# Patient Record
Sex: FEMALE | Race: WHITE | NOT HISPANIC OR LATINO | Employment: FULL TIME | URBAN - METROPOLITAN AREA
[De-identification: names, ages, dates, MRNs, and addresses within clinical notes are randomized per-mention and may not be internally consistent; named-entity substitution may affect disease eponyms.]

---

## 2023-09-18 PROBLEM — N83.201 RIGHT OVARIAN CYST: Status: ACTIVE | Noted: 2023-09-18

## 2023-09-18 PROBLEM — N20.0 KIDNEY STONES: Status: ACTIVE | Noted: 2023-09-18

## 2023-09-18 RX ORDER — MINOCYCLINE HYDROCHLORIDE 50 MG/1
1 CAPSULE ORAL EVERY 6 HOURS
COMMUNITY

## 2023-10-03 ENCOUNTER — OFFICE VISIT (OUTPATIENT)
Dept: PRIMARY CARE | Facility: CLINIC | Age: 38
End: 2023-10-03
Payer: COMMERCIAL

## 2023-10-03 VITALS
DIASTOLIC BLOOD PRESSURE: 70 MMHG | WEIGHT: 82 LBS | SYSTOLIC BLOOD PRESSURE: 120 MMHG | OXYGEN SATURATION: 97 % | HEART RATE: 97 BPM

## 2023-10-03 DIAGNOSIS — E55.9 VITAMIN D DEFICIENCY: ICD-10-CM

## 2023-10-03 DIAGNOSIS — Z72.0 TOBACCO USE: ICD-10-CM

## 2023-10-03 DIAGNOSIS — R06.00 DYSPNEA, UNSPECIFIED TYPE: ICD-10-CM

## 2023-10-03 DIAGNOSIS — M25.562 ACUTE PAIN OF LEFT KNEE: ICD-10-CM

## 2023-10-03 DIAGNOSIS — R73.9 HYPERGLYCEMIA: ICD-10-CM

## 2023-10-03 DIAGNOSIS — R63.4 WEIGHT LOSS, UNINTENTIONAL: ICD-10-CM

## 2023-10-03 DIAGNOSIS — E03.9 HYPOTHYROIDISM, UNSPECIFIED TYPE: ICD-10-CM

## 2023-10-03 DIAGNOSIS — E78.2 MIXED HYPERLIPIDEMIA: ICD-10-CM

## 2023-10-03 DIAGNOSIS — Z00.00 ANNUAL PHYSICAL EXAM: Primary | ICD-10-CM

## 2023-10-03 DIAGNOSIS — Z01.89 ENCOUNTER FOR ROUTINE LABORATORY TESTING: ICD-10-CM

## 2023-10-03 DIAGNOSIS — R06.02 SHORTNESS OF BREATH ON EXERTION: ICD-10-CM

## 2023-10-03 PROCEDURE — 99385 PREV VISIT NEW AGE 18-39: CPT | Performed by: NURSE PRACTITIONER

## 2023-10-03 PROCEDURE — 4004F PT TOBACCO SCREEN RCVD TLK: CPT | Performed by: NURSE PRACTITIONER

## 2023-10-03 ASSESSMENT — ENCOUNTER SYMPTOMS
NEUROLOGICAL NEGATIVE: 1
GASTROINTESTINAL NEGATIVE: 1
MUSCULOSKELETAL NEGATIVE: 1
CONSTITUTIONAL NEGATIVE: 1
CARDIOVASCULAR NEGATIVE: 1
PSYCHIATRIC NEGATIVE: 1
SHORTNESS OF BREATH: 1
HEMATOLOGIC/LYMPHATIC NEGATIVE: 1
ALLERGIC/IMMUNOLOGIC NEGATIVE: 1
ENDOCRINE NEGATIVE: 1
COUGH: 1
EYES NEGATIVE: 1

## 2023-10-03 ASSESSMENT — PAIN SCALES - GENERAL: PAINLEVEL: 6

## 2023-10-03 NOTE — PROGRESS NOTES
"[facility]: [facility]  PROGRESS NOTE      Annalee Banuelos is a 38 y.o. female that is presenting today to establish PCP. She is currently c/o left knee pain and unintentional weight loss. She is also requesting CPE today as it has been \"Years\" since last medical check up.    ASSESSMENT / PLAN:  Diagnoses and all orders for this visit:  Annual physical exam  Tobacco use  Weight loss, unintentional  Acute pain of left knee  -     XR knee left 3 views; Future  Shortness of breath on exertion  Encounter for routine laboratory testing  -     Basic Metabolic Panel; Future  -     CBC and Auto Differential; Future  -     Hemoglobin A1C; Future  -     Hepatic Function Panel; Future  -     Lipid Panel; Future  -     Vitamin D 25-Hydroxy,Total (for eval of Vitamin D levels); Future  Hyperglycemia  -     Hemoglobin A1C; Future  Mixed hyperlipidemia  -     Lipid Panel; Future  Vitamin D deficiency  -     Vitamin D 25-Hydroxy,Total (for eval of Vitamin D levels); Future  Hypothyroidism, unspecified type  -     Thyroid Stimulating Hormone; Future  Dyspnea, unspecified type  -     XR chest 2 views; Future  Subjective   Subjective   Annalee Banuelos is a 38 y.o. female who presents with knee pain involving the left knee. Onset was sudden, not related to any specific activity. Inciting event: none known. Current symptoms include: pain located anterior and lateral. Pain is aggravated by rising after sitting and squatting. Patient has had no prior knee problems. Evaluation to date: none. Treatment to date: OTC analgesics which are somewhat effective.    Objective   /70 (BP Location: Left arm, Patient Position: Sitting, BP Cuff Size: Small adult)   Pulse 97   Wt (!) 37.2 kg (82 lb)   SpO2 97%   Right knee: normal  Left knee:  no effusion, full active range of motion, no joint line tenderness, ligamentous structures intact.    X-ray: ordered, but results not yet available.    Subjective  Annalee Banuelos " "is a 38 y.o. female here for discussion regarding unexplained/excessive weight loss. Onset was 1 year ago. Patient has lost approximately 20 pounds in last 1 year. She feels ideal weight is 95 pounds. History of eating disorders: none. Patient is not exercising at all. Factors associated with weight loss: none. Patient denies: abdominal pain, binge/ purge behaviors, constipation, depressive symptoms, diarrhea, heat intolerance, intentional calorie restriction, poor appetite, and unexplained fatigue.  Patient reports she quit ETOH approximately 1 year ago. She reports \"a couple beers a night\".  She reports her usual weight is 100-105#    Objective  There is no height or weight on file to calculate BMI.  [unfilled]    Assessment/Plan  Unexplained weight loss.  Initiate evaluation for hyperthyroidism.       Assessment/Plan   Left Mild knee sprain on the left     Rest, ice, compression, and elevation (RICE) therapy.  Reduction in offending activity.  Patellar compression sleeve.  PT referral.            Review of Systems   Constitutional: Negative.    HENT: Negative.     Eyes: Negative.    Respiratory:  Positive for cough and shortness of breath.         SOBE. + Tobacco use - 1ppd x 23yrs   Cardiovascular: Negative.    Gastrointestinal: Negative.    Endocrine: Negative.    Genitourinary: Negative.         + Hx of renal calculi   Musculoskeletal: Negative.    Skin: Negative.    Allergic/Immunologic: Negative.    Neurological: Negative.    Hematological: Negative.    Psychiatric/Behavioral: Negative.     All other systems reviewed and are negative.     Objective   Vitals:    10/03/23 1425   BP: 120/70   Pulse: 97   SpO2: 97%      There is no height or weight on file to calculate BMI.  Physical Exam  Vitals and nursing note reviewed.   Constitutional:       General: She is not in acute distress.     Appearance: She is underweight. She is not ill-appearing.   HENT:      Head: Normocephalic and atraumatic.      Right Ear: " "Tympanic membrane, ear canal and external ear normal. There is no impacted cerumen.      Left Ear: Tympanic membrane, ear canal and external ear normal. There is no impacted cerumen.      Nose: Nose normal.      Mouth/Throat:      Mouth: Mucous membranes are moist.      Pharynx: Oropharynx is clear. No oropharyngeal exudate or posterior oropharyngeal erythema.   Eyes:      General: No scleral icterus.        Right eye: No discharge.         Left eye: No discharge.      Extraocular Movements: Extraocular movements intact.      Conjunctiva/sclera: Conjunctivae normal.      Pupils: Pupils are equal, round, and reactive to light.   Neck:      Vascular: No carotid bruit.   Cardiovascular:      Rate and Rhythm: Normal rate and regular rhythm.      Pulses: Normal pulses.      Heart sounds: Normal heart sounds. No murmur heard.     No friction rub. No gallop.   Pulmonary:      Effort: Pulmonary effort is normal. No respiratory distress.      Breath sounds: Normal breath sounds.   Abdominal:      General: Abdomen is flat. Bowel sounds are normal. There is no distension.      Palpations: Abdomen is soft.      Tenderness: There is no abdominal tenderness.      Hernia: No hernia is present.   Musculoskeletal:         General: No swelling or tenderness. Normal range of motion.   Lymphadenopathy:      Cervical: No cervical adenopathy.   Skin:     General: Skin is warm and dry.      Capillary Refill: Capillary refill takes less than 2 seconds.      Coloration: Skin is not jaundiced.      Findings: No rash.   Neurological:      General: No focal deficit present.      Mental Status: She is alert and oriented to person, place, and time. Mental status is at baseline.   Psychiatric:         Mood and Affect: Mood normal.         Behavior: Behavior normal.       Diagnostic Results   No results found for: \"GLUCOSE\", \"CALCIUM\", \"NA\", \"K\", \"CO2\", \"CL\", \"BUN\", \"CREATININE\"  No results found for: \"ALT\", \"AST\", \"GGT\", \"ALKPHOS\", \"BILITOT\"  No " "results found for: \"WBC\", \"HGB\", \"HCT\", \"MCV\", \"PLT\"  No results found for: \"CHOL\"  No results found for: \"HDL\"  No results found for: \"LDLCALC\"  No results found for: \"TRIG\"  No components found for: \"CHOLHDL\"  No results found for: \"HGBA1C\"  Other labs not included in the list above were reviewed either before or during this encounter.    History    History reviewed. No pertinent past medical history.  Past Surgical History:   Procedure Laterality Date    BACK SURGERY      scoliosis surgery     Family History   Problem Relation Name Age of Onset    Hypertension Mother      Hypertension Father      Scoliosis Father      No Known Problems Brother      No Known Problems Brother      No Known Problems Son          heart problems    No Known Problems Son      Hypertension Maternal Grandmother      Breast cancer Maternal Grandmother      Hypertension Maternal Grandfather      Diabetes Maternal Grandfather      Hypertension Paternal Grandmother      Diabetes Paternal Grandmother      Diabetes Paternal Grandfather      Hypertension Paternal Grandfather      Liver cancer Paternal Grandfather      Kidney cancer Paternal Grandfather       No Known Allergies  Current Outpatient Medications on File Prior to Visit   Medication Sig Dispense Refill    levonorgestrel (Mirena) 21 mcg/24 hours (8 yrs) 52 mg IUD by intrauterine route.      minocycline 50 mg capsule Take 1 capsule (50 mg) by mouth every 6 hours.       No current facility-administered medications on file prior to visit.     Immunization History   Administered Date(s) Administered    Hepatitis B vaccine, pediatric/adolescent (RECOMBIVAX, ENGERIX) 01/12/2000, 02/14/2000, 08/17/2000    PPD Test 03/04/2011, 04/04/2011, 04/11/2011    Pfizer Purple Cap SARS-CoV-2 08/28/2021, 09/18/2021    Tdap vaccine, age 7 year and older (BOOSTRIX) 03/04/2011     Patient's medical history was reviewed and updated either before or during this encounter.    Rcuhi Dang, APRN-CNP  "

## 2023-10-04 ENCOUNTER — ANCILLARY PROCEDURE (OUTPATIENT)
Dept: RADIOLOGY | Facility: CLINIC | Age: 38
End: 2023-10-04
Payer: COMMERCIAL

## 2023-10-04 ENCOUNTER — LAB (OUTPATIENT)
Dept: LAB | Facility: LAB | Age: 38
End: 2023-10-04
Payer: COMMERCIAL

## 2023-10-04 DIAGNOSIS — E55.9 VITAMIN D DEFICIENCY: ICD-10-CM

## 2023-10-04 DIAGNOSIS — R73.9 HYPERGLYCEMIA: ICD-10-CM

## 2023-10-04 DIAGNOSIS — M25.562 ACUTE PAIN OF LEFT KNEE: ICD-10-CM

## 2023-10-04 DIAGNOSIS — Z01.89 ENCOUNTER FOR ROUTINE LABORATORY TESTING: ICD-10-CM

## 2023-10-04 DIAGNOSIS — E78.2 MIXED HYPERLIPIDEMIA: ICD-10-CM

## 2023-10-04 DIAGNOSIS — E03.9 HYPOTHYROIDISM, UNSPECIFIED TYPE: ICD-10-CM

## 2023-10-04 DIAGNOSIS — R06.00 DYSPNEA, UNSPECIFIED TYPE: ICD-10-CM

## 2023-10-04 LAB
25(OH)D3 SERPL-MCNC: 11 NG/ML (ref 31–100)
ALBUMIN SERPL-MCNC: 4.8 G/DL (ref 3.5–5)
ALP BLD-CCNC: 70 U/L (ref 35–125)
ALT SERPL-CCNC: 17 U/L (ref 5–40)
ANION GAP SERPL CALC-SCNC: 12 MMOL/L
AST SERPL-CCNC: 20 U/L (ref 5–40)
BASOPHILS # BLD AUTO: 0.06 X10*3/UL (ref 0–0.1)
BASOPHILS NFR BLD AUTO: 0.8 %
BILIRUB DIRECT SERPL-MCNC: <0.2 MG/DL (ref 0–0.2)
BILIRUB SERPL-MCNC: 0.4 MG/DL (ref 0.1–1.2)
BUN SERPL-MCNC: 11 MG/DL (ref 8–25)
CALCIUM SERPL-MCNC: 9.3 MG/DL (ref 8.5–10.4)
CHLORIDE SERPL-SCNC: 103 MMOL/L (ref 97–107)
CHOLEST SERPL-MCNC: 194 MG/DL (ref 133–200)
CHOLEST/HDLC SERPL: 2.8 {RATIO}
CO2 SERPL-SCNC: 25 MMOL/L (ref 24–31)
CREAT SERPL-MCNC: 0.5 MG/DL (ref 0.4–1.6)
EOSINOPHIL # BLD AUTO: 0.07 X10*3/UL (ref 0–0.7)
EOSINOPHIL NFR BLD AUTO: 0.9 %
ERYTHROCYTE [DISTWIDTH] IN BLOOD BY AUTOMATED COUNT: 13.1 % (ref 11.5–14.5)
EST. AVERAGE GLUCOSE BLD GHB EST-MCNC: 100 MG/DL
GFR SERPL CREATININE-BSD FRML MDRD: >90 ML/MIN/1.73M*2
GLUCOSE SERPL-MCNC: 104 MG/DL (ref 65–99)
HBA1C MFR BLD: 5.1 %
HCT VFR BLD AUTO: 50.4 % (ref 36–46)
HDLC SERPL-MCNC: 70 MG/DL
HGB BLD-MCNC: 16.2 G/DL (ref 12–16)
IMM GRANULOCYTES # BLD AUTO: 0.01 X10*3/UL (ref 0–0.7)
IMM GRANULOCYTES NFR BLD AUTO: 0.1 % (ref 0–0.9)
LDLC SERPL CALC-MCNC: 107 MG/DL (ref 65–130)
LYMPHOCYTES # BLD AUTO: 2.46 X10*3/UL (ref 1.2–4.8)
LYMPHOCYTES NFR BLD AUTO: 33.2 %
MCH RBC QN AUTO: 32 PG (ref 26–34)
MCHC RBC AUTO-ENTMCNC: 32.1 G/DL (ref 32–36)
MCV RBC AUTO: 99 FL (ref 80–100)
MONOCYTES # BLD AUTO: 0.54 X10*3/UL (ref 0.1–1)
MONOCYTES NFR BLD AUTO: 7.3 %
NEUTROPHILS # BLD AUTO: 4.26 X10*3/UL (ref 1.2–7.7)
NEUTROPHILS NFR BLD AUTO: 57.7 %
NRBC BLD-RTO: 0 /100 WBCS (ref 0–0)
PLATELET # BLD AUTO: 176 X10*3/UL (ref 150–450)
PMV BLD AUTO: 12.9 FL (ref 7.5–11.5)
POTASSIUM SERPL-SCNC: 5.1 MMOL/L (ref 3.4–5.1)
PROT SERPL-MCNC: 6.6 G/DL (ref 5.9–7.9)
RBC # BLD AUTO: 5.07 X10*6/UL (ref 4–5.2)
SODIUM SERPL-SCNC: 140 MMOL/L (ref 133–145)
TRIGL SERPL-MCNC: 86 MG/DL (ref 40–150)
TSH SERPL DL<=0.05 MIU/L-ACNC: 0.79 MIU/L (ref 0.27–4.2)
WBC # BLD AUTO: 7.4 X10*3/UL (ref 4.4–11.3)

## 2023-10-04 PROCEDURE — 82306 VITAMIN D 25 HYDROXY: CPT

## 2023-10-04 PROCEDURE — 82248 BILIRUBIN DIRECT: CPT

## 2023-10-04 PROCEDURE — 80050 GENERAL HEALTH PANEL: CPT

## 2023-10-04 PROCEDURE — 36415 COLL VENOUS BLD VENIPUNCTURE: CPT

## 2023-10-04 PROCEDURE — 71046 X-RAY EXAM CHEST 2 VIEWS: CPT

## 2023-10-04 PROCEDURE — 73562 X-RAY EXAM OF KNEE 3: CPT | Mod: LT

## 2023-10-04 PROCEDURE — 80061 LIPID PANEL: CPT

## 2023-10-04 PROCEDURE — 83036 HEMOGLOBIN GLYCOSYLATED A1C: CPT

## 2024-10-08 ENCOUNTER — APPOINTMENT (OUTPATIENT)
Dept: PRIMARY CARE | Facility: CLINIC | Age: 39
End: 2024-10-08
Payer: COMMERCIAL

## 2024-10-08 VITALS
HEART RATE: 82 BPM | TEMPERATURE: 97.7 F | OXYGEN SATURATION: 96 % | WEIGHT: 79 LBS | SYSTOLIC BLOOD PRESSURE: 122 MMHG | DIASTOLIC BLOOD PRESSURE: 76 MMHG

## 2024-10-08 DIAGNOSIS — R63.4 WEIGHT LOSS, UNINTENTIONAL: ICD-10-CM

## 2024-10-08 DIAGNOSIS — Z72.0 TOBACCO USE: ICD-10-CM

## 2024-10-08 DIAGNOSIS — R53.83 FATIGUE, UNSPECIFIED TYPE: ICD-10-CM

## 2024-10-08 DIAGNOSIS — Z23 ENCOUNTER FOR IMMUNIZATION: ICD-10-CM

## 2024-10-08 DIAGNOSIS — R06.02 SOB (SHORTNESS OF BREATH): ICD-10-CM

## 2024-10-08 DIAGNOSIS — Z11.59 NEED FOR HEPATITIS C SCREENING TEST: ICD-10-CM

## 2024-10-08 DIAGNOSIS — Z00.00 ANNUAL PHYSICAL EXAM: Primary | ICD-10-CM

## 2024-10-08 DIAGNOSIS — Z12.31 ENCOUNTER FOR SCREENING MAMMOGRAM FOR BREAST CANCER: ICD-10-CM

## 2024-10-08 PROCEDURE — 99396 PREV VISIT EST AGE 40-64: CPT | Performed by: NURSE PRACTITIONER

## 2024-10-08 PROCEDURE — 90472 IMMUNIZATION ADMIN EACH ADD: CPT | Performed by: NURSE PRACTITIONER

## 2024-10-08 PROCEDURE — 90656 IIV3 VACC NO PRSV 0.5 ML IM: CPT | Performed by: NURSE PRACTITIONER

## 2024-10-08 PROCEDURE — 90715 TDAP VACCINE 7 YRS/> IM: CPT | Performed by: NURSE PRACTITIONER

## 2024-10-08 PROCEDURE — 90471 IMMUNIZATION ADMIN: CPT | Performed by: NURSE PRACTITIONER

## 2024-10-08 RX ORDER — MIRTAZAPINE 7.5 MG/1
7.5 TABLET, FILM COATED ORAL NIGHTLY
Qty: 30 TABLET | Refills: 2 | Status: SHIPPED | OUTPATIENT
Start: 2024-10-08 | End: 2025-01-06

## 2024-10-08 ASSESSMENT — ENCOUNTER SYMPTOMS
DYSURIA: 0
CONFUSION: 0
CHILLS: 0
DIZZINESS: 0
NECK PAIN: 0
POLYDIPSIA: 0
FEVER: 0
POLYPHAGIA: 0
BRUISES/BLEEDS EASILY: 0
PALPITATIONS: 0
FACIAL ASYMMETRY: 0
DIAPHORESIS: 0
SPEECH DIFFICULTY: 0
FATIGUE: 1
HEADACHES: 0
BLOOD IN STOOL: 0
WOUND: 0
NAUSEA: 0
FLANK PAIN: 0
CHEST TIGHTNESS: 1
SEIZURES: 0
ADENOPATHY: 0
DEPRESSION: 0
HEMATURIA: 0
UNEXPECTED WEIGHT CHANGE: 1
OCCASIONAL FEELINGS OF UNSTEADINESS: 0
ABDOMINAL PAIN: 0
AGITATION: 0
BACK PAIN: 0
COUGH: 1
VOMITING: 0
SHORTNESS OF BREATH: 1
LOSS OF SENSATION IN FEET: 0

## 2024-10-08 ASSESSMENT — LIFESTYLE VARIABLES
HOW MANY STANDARD DRINKS CONTAINING ALCOHOL DO YOU HAVE ON A TYPICAL DAY: PATIENT DOES NOT DRINK
HAVE YOU OR SOMEONE ELSE BEEN INJURED AS A RESULT OF YOUR DRINKING: NO
HOW OFTEN DURING THE LAST YEAR HAVE YOU NEEDED AN ALCOHOLIC DRINK FIRST THING IN THE MORNING TO GET YOURSELF GOING AFTER A NIGHT OF HEAVY DRINKING: NEVER
HAS A RELATIVE, FRIEND, DOCTOR, OR ANOTHER HEALTH PROFESSIONAL EXPRESSED CONCERN ABOUT YOUR DRINKING OR SUGGESTED YOU CUT DOWN: NO
HOW OFTEN DO YOU HAVE A DRINK CONTAINING ALCOHOL: NEVER
SKIP TO QUESTIONS 9-10: 1
HOW OFTEN DURING THE LAST YEAR HAVE YOU BEEN UNABLE TO REMEMBER WHAT HAPPENED THE NIGHT BEFORE BECAUSE YOU HAD BEEN DRINKING: NEVER
HOW OFTEN DURING THE LAST YEAR HAVE YOU HAD A FEELING OF GUILT OR REMORSE AFTER DRINKING: NEVER
AUDIT TOTAL SCORE: 0
AUDIT-C TOTAL SCORE: 0
HOW OFTEN DO YOU HAVE SIX OR MORE DRINKS ON ONE OCCASION: NEVER
HOW OFTEN DURING THE LAST YEAR HAVE YOU FAILED TO DO WHAT WAS NORMALLY EXPECTED FROM YOU BECAUSE OF DRINKING: NEVER

## 2024-10-08 ASSESSMENT — PATIENT HEALTH QUESTIONNAIRE - PHQ9
SUM OF ALL RESPONSES TO PHQ9 QUESTIONS 1 AND 2: 0
1. LITTLE INTEREST OR PLEASURE IN DOING THINGS: NOT AT ALL
2. FEELING DOWN, DEPRESSED OR HOPELESS: NOT AT ALL

## 2024-10-08 ASSESSMENT — PAIN SCALES - GENERAL: PAINLEVEL: 0-NO PAIN

## 2024-10-08 NOTE — PROGRESS NOTES
"[facility]: [facility]  PHYSICAL EXAM      April Daisha is a 39 y.o. female that is presenting today for Annual Physical Exam.  Advised annual influenza and Tdap immunizations today.    She is concerned for unintentional weight loss. She is underweight and has lost 3# the past year despite eating \"regularly\".  When describing her dietary choices, she admits to skipping breakfast, small sandwhich with chex mix for lunch and lasagna for dinner with bread. Admits to lack of appetite.  Denies h/o bulemia or anorexia. Denies depression or anxiety.    She is c/o SOB s/p Covid 10/2022. She states worse with exertion. Admits to chest tightness, wheezing (occasional), dry cough when she is \"struggling to breathe\". She continues to smoke 1/2 ppd. Not interested in quitting at this time.    Assessment/Plan   Diagnoses and all orders for this visit:    Annual physical exam    Need for hepatitis C screening test  -     Hepatitis C antibody; Future    Fatigue, unspecified type  -     CBC and Auto Differential; Future  -     Basic Metabolic Panel; Future    Encounter for screening mammogram for breast cancer  -     BI mammo bilateral screening tomosynthesis; Future    Encounter for immunization  -     Flu vaccine, trivalent, preservative free, age 6 months and greater (Fluarix/Fluzone/Flulaval)  -     Tdap vaccine, age 7 years and older    Tobacco use  -     Complete Pulmonary Function Test (Spirometry/DLCO/Lung Volumes); Future  -      smoking cessation encouraged    SOB (shortness of breath)  -     Complete Pulmonary Function Test (Spirometry/DLCO/Lung Volumes); Future    Weight loss, unintentional  -     TSH with reflex to Free T4 if abnormal; Future  -     start mirtazapine (Remeron) 7.5 mg tablet; Take 1 tablet (7.5 mg) by mouth once daily at bedtime.  -     FREDIS; Future  -     Sedimentation Rate; Future  -     C-reactive protein; Future  -     declined dietician referral  -     advised to add high " calorie boost/ensure twice daily in addition to 3 times daily meals  -     follow up in 3 months for weight check    Other orders  -     Follow Up In Primary Care - Established; Future    Subjective   HPI  Review of Systems   Constitutional:  Positive for fatigue and unexpected weight change. Negative for chills, diaphoresis and fever.   HENT:  Negative for hearing loss and mouth sores.    Eyes:  Negative for visual disturbance.   Respiratory:  Positive for cough, chest tightness and shortness of breath.         + tobacco use   Cardiovascular:  Negative for chest pain, palpitations and leg swelling.   Gastrointestinal:  Negative for abdominal pain, blood in stool, nausea and vomiting.   Endocrine: Negative for cold intolerance, heat intolerance, polydipsia, polyphagia and polyuria.   Genitourinary:  Negative for dysuria, flank pain and hematuria.   Musculoskeletal:  Negative for back pain and neck pain.   Skin:  Negative for rash and wound.   Allergic/Immunologic: Negative for environmental allergies, food allergies and immunocompromised state.   Neurological:  Negative for dizziness, seizures, syncope, facial asymmetry, speech difficulty and headaches.   Hematological:  Negative for adenopathy. Does not bruise/bleed easily.   Psychiatric/Behavioral:  Negative for agitation and confusion.       Objective   Vitals:    10/08/24 1437   BP: 122/76   Pulse: 82   Temp: 36.5 °C (97.7 °F)   SpO2: 96%     There is no height or weight on file to calculate BMI.  Physical Exam  Vitals and nursing note reviewed.   Constitutional:       General: She is not in acute distress.     Appearance: Normal appearance. She is not ill-appearing.      Comments: Extremely underweight   HENT:      Head: Normocephalic and atraumatic.      Right Ear: Tympanic membrane, ear canal and external ear normal. There is no impacted cerumen.      Left Ear: Tympanic membrane, ear canal and external ear normal. There is no impacted cerumen.      Nose: Nose  normal.      Mouth/Throat:      Mouth: Mucous membranes are moist.      Pharynx: Oropharynx is clear. No oropharyngeal exudate or posterior oropharyngeal erythema.   Eyes:      General: No scleral icterus.        Right eye: No discharge.         Left eye: No discharge.      Extraocular Movements: Extraocular movements intact.      Conjunctiva/sclera: Conjunctivae normal.      Pupils: Pupils are equal, round, and reactive to light.   Cardiovascular:      Rate and Rhythm: Normal rate and regular rhythm.      Pulses: Normal pulses.      Heart sounds: Normal heart sounds. No murmur heard.  Pulmonary:      Effort: Pulmonary effort is normal. No respiratory distress.      Breath sounds: Normal breath sounds.   Abdominal:      General: Abdomen is flat. Bowel sounds are normal. There is no distension.      Palpations: Abdomen is soft. There is no mass.      Tenderness: There is no abdominal tenderness. There is no right CVA tenderness or left CVA tenderness.      Hernia: No hernia is present.   Musculoskeletal:         General: No tenderness. Normal range of motion.      Cervical back: No tenderness.      Right lower leg: No edema.      Left lower leg: No edema.   Lymphadenopathy:      Cervical: No cervical adenopathy.   Skin:     General: Skin is warm and dry.      Coloration: Skin is not jaundiced.      Findings: No rash.   Neurological:      General: No focal deficit present.      Mental Status: She is alert and oriented to person, place, and time. Mental status is at baseline.   Psychiatric:         Mood and Affect: Mood normal.         Behavior: Behavior normal.       Diagnostic Results   Lab Results   Component Value Date    GLUCOSE 104 (H) 10/04/2023    CALCIUM 9.3 10/04/2023     10/04/2023    K 5.1 10/04/2023    CO2 25 10/04/2023     10/04/2023    BUN 11 10/04/2023    CREATININE 0.50 10/04/2023     Lab Results   Component Value Date    ALT 17 10/04/2023    AST 20 10/04/2023    ALKPHOS 70 10/04/2023     "BILITOT 0.4 10/04/2023     Lab Results   Component Value Date    WBC 7.4 10/04/2023    HGB 16.2 (H) 10/04/2023    HCT 50.4 (H) 10/04/2023    MCV 99 10/04/2023     10/04/2023     Lab Results   Component Value Date    CHOL 194 10/04/2023     Lab Results   Component Value Date    HDL 70.0 10/04/2023     Lab Results   Component Value Date    LDLCALC 107 10/04/2023     Lab Results   Component Value Date    TRIG 86 10/04/2023     No components found for: \"CHOLHDL\"  Lab Results   Component Value Date    HGBA1C 5.1 10/04/2023     Other labs not included in the list above were reviewed either before or during this encounter.    History   History reviewed. No pertinent past medical history.  Past Surgical History:   Procedure Laterality Date    BACK SURGERY      scoliosis surgery     Family History   Problem Relation Name Age of Onset    Hypertension Mother      Hypertension Father      Scoliosis Father      No Known Problems Brother      No Known Problems Brother      No Known Problems Son          heart problems    No Known Problems Son      Hypertension Maternal Grandmother      Breast cancer Maternal Grandmother      Hypertension Maternal Grandfather      Diabetes Maternal Grandfather      Hypertension Paternal Grandmother      Diabetes Paternal Grandmother      Diabetes Paternal Grandfather      Hypertension Paternal Grandfather      Liver cancer Paternal Grandfather      Kidney cancer Paternal Grandfather       Social History     Socioeconomic History    Marital status: Single     Spouse name: Not on file    Number of children: Not on file    Years of education: Not on file    Highest education level: Not on file   Occupational History    Not on file   Tobacco Use    Smoking status: Every Day     Current packs/day: 0.50     Types: Cigarettes    Smokeless tobacco: Never   Substance and Sexual Activity    Alcohol use: Not Currently     Comment: social    Drug use: Not on file    Sexual activity: Not on file   Other " Topics Concern    Not on file   Social History Narrative    Not on file     Social Determinants of Health     Financial Resource Strain: Not on file   Food Insecurity: Not on file   Transportation Needs: Not on file   Physical Activity: Not on file   Stress: Not on file   Social Connections: Not on file   Intimate Partner Violence: Not on file   Housing Stability: Not on file     No Known Allergies  Current Outpatient Medications on File Prior to Visit   Medication Sig Dispense Refill    levonorgestrel (Mirena) 21 mcg/24 hours (8 yrs) 52 mg IUD by intrauterine route.      [DISCONTINUED] minocycline 50 mg capsule Take 1 capsule (50 mg) by mouth every 6 hours.       No current facility-administered medications on file prior to visit.     Immunization History   Administered Date(s) Administered    Hepatitis B vaccine, 19 yrs and under (RECOMBIVAX, ENGERIX) 01/12/2000, 02/14/2000, 08/17/2000    PPD Test 03/04/2011, 04/04/2011, 04/11/2011    Pfizer Purple Cap SARS-CoV-2 08/28/2021, 09/18/2021    Tdap vaccine, age 7 year and older (BOOSTRIX, ADACEL) 03/04/2011     Patient's medical history was reviewed and updated either before or during this encounter.       Ruchi Dang, APRN-CNP

## 2025-01-08 ENCOUNTER — TELEPHONE (OUTPATIENT)
Dept: PRIMARY CARE | Facility: CLINIC | Age: 40
End: 2025-01-08
Payer: COMMERCIAL

## 2025-01-14 ENCOUNTER — APPOINTMENT (OUTPATIENT)
Dept: PRIMARY CARE | Facility: CLINIC | Age: 40
End: 2025-01-14
Payer: COMMERCIAL

## 2025-01-14 ENCOUNTER — APPOINTMENT (OUTPATIENT)
Dept: RADIOLOGY | Facility: CLINIC | Age: 40
End: 2025-01-14
Payer: COMMERCIAL

## 2025-03-04 ENCOUNTER — APPOINTMENT (OUTPATIENT)
Dept: PRIMARY CARE | Facility: CLINIC | Age: 40
End: 2025-03-04
Payer: COMMERCIAL

## 2025-03-31 ENCOUNTER — OFFICE VISIT (OUTPATIENT)
Dept: URGENT CARE | Age: 40
End: 2025-03-31
Payer: COMMERCIAL

## 2025-03-31 ENCOUNTER — APPOINTMENT (OUTPATIENT)
Dept: CARDIOLOGY | Facility: HOSPITAL | Age: 40
End: 2025-03-31
Payer: COMMERCIAL

## 2025-03-31 ENCOUNTER — HOSPITAL ENCOUNTER (EMERGENCY)
Facility: HOSPITAL | Age: 40
Discharge: HOME | End: 2025-03-31
Attending: STUDENT IN AN ORGANIZED HEALTH CARE EDUCATION/TRAINING PROGRAM
Payer: COMMERCIAL

## 2025-03-31 ENCOUNTER — APPOINTMENT (OUTPATIENT)
Dept: RADIOLOGY | Facility: HOSPITAL | Age: 40
End: 2025-03-31
Payer: COMMERCIAL

## 2025-03-31 VITALS
DIASTOLIC BLOOD PRESSURE: 85 MMHG | RESPIRATION RATE: 24 BRPM | WEIGHT: 72 LBS | SYSTOLIC BLOOD PRESSURE: 148 MMHG | OXYGEN SATURATION: 89 % | BODY MASS INDEX: 13.59 KG/M2 | HEIGHT: 61 IN | HEART RATE: 122 BPM | TEMPERATURE: 98.1 F

## 2025-03-31 VITALS
DIASTOLIC BLOOD PRESSURE: 103 MMHG | WEIGHT: 72 LBS | TEMPERATURE: 99 F | BODY MASS INDEX: 12.29 KG/M2 | RESPIRATION RATE: 28 BRPM | HEIGHT: 64 IN | HEART RATE: 99 BPM | OXYGEN SATURATION: 97 % | SYSTOLIC BLOOD PRESSURE: 155 MMHG

## 2025-03-31 DIAGNOSIS — J44.1 COPD EXACERBATION (MULTI): ICD-10-CM

## 2025-03-31 DIAGNOSIS — R06.00 DYSPNEA, UNSPECIFIED TYPE: ICD-10-CM

## 2025-03-31 DIAGNOSIS — R06.02 SHORTNESS OF BREATH: Primary | ICD-10-CM

## 2025-03-31 DIAGNOSIS — J18.9 ATYPICAL PNEUMONIA: Primary | ICD-10-CM

## 2025-03-31 LAB
ALBUMIN SERPL BCP-MCNC: 4.2 G/DL (ref 3.4–5)
ALP SERPL-CCNC: 54 U/L (ref 33–110)
ALT SERPL W P-5'-P-CCNC: 12 U/L (ref 7–45)
ANION GAP SERPL CALCULATED.3IONS-SCNC: 10 MMOL/L (ref 10–20)
AST SERPL W P-5'-P-CCNC: 12 U/L (ref 9–39)
B-HCG SERPL-ACNC: <2 MIU/ML
BASOPHILS # BLD MANUAL: 0.1 X10*3/UL (ref 0–0.1)
BASOPHILS NFR BLD MANUAL: 1 %
BILIRUB SERPL-MCNC: 0.3 MG/DL (ref 0–1.2)
BUN SERPL-MCNC: 14 MG/DL (ref 6–23)
CALCIUM SERPL-MCNC: 9.1 MG/DL (ref 8.6–10.3)
CARDIAC TROPONIN I PNL SERPL HS: 3 NG/L (ref 0–13)
CARDIAC TROPONIN I PNL SERPL HS: <3 NG/L (ref 0–13)
CHLORIDE SERPL-SCNC: 103 MMOL/L (ref 98–107)
CO2 SERPL-SCNC: 31 MMOL/L (ref 21–32)
CREAT SERPL-MCNC: 0.51 MG/DL (ref 0.5–1.05)
EGFRCR SERPLBLD CKD-EPI 2021: >90 ML/MIN/1.73M*2
EOSINOPHIL # BLD MANUAL: 0.1 X10*3/UL (ref 0–0.7)
EOSINOPHIL NFR BLD MANUAL: 1 %
ERYTHROCYTE [DISTWIDTH] IN BLOOD BY AUTOMATED COUNT: 12.4 % (ref 11.5–14.5)
GLUCOSE SERPL-MCNC: 79 MG/DL (ref 74–99)
HCT VFR BLD AUTO: 51.1 % (ref 36–46)
HGB BLD-MCNC: 16.8 G/DL (ref 12–16)
IMM GRANULOCYTES # BLD AUTO: 0.02 X10*3/UL (ref 0–0.7)
IMM GRANULOCYTES NFR BLD AUTO: 0.2 % (ref 0–0.9)
LYMPHOCYTES # BLD MANUAL: 3.16 X10*3/UL (ref 1.2–4.8)
LYMPHOCYTES NFR BLD MANUAL: 31 %
MAGNESIUM SERPL-MCNC: 2.01 MG/DL (ref 1.6–2.4)
MCH RBC QN AUTO: 32.6 PG (ref 26–34)
MCHC RBC AUTO-ENTMCNC: 32.9 G/DL (ref 32–36)
MCV RBC AUTO: 99 FL (ref 80–100)
MONOCYTES # BLD MANUAL: 1.02 X10*3/UL (ref 0.1–1)
MONOCYTES NFR BLD MANUAL: 10 %
NEUTS SEG # BLD MANUAL: 5.81 X10*3/UL (ref 1.2–7)
NEUTS SEG NFR BLD MANUAL: 57 %
NRBC BLD-RTO: 0 /100 WBCS (ref 0–0)
PLATELET # BLD AUTO: 207 X10*3/UL (ref 150–450)
POTASSIUM SERPL-SCNC: 4.6 MMOL/L (ref 3.5–5.3)
PROT SERPL-MCNC: 6.7 G/DL (ref 6.4–8.2)
RBC # BLD AUTO: 5.15 X10*6/UL (ref 4–5.2)
RBC MORPH BLD: ABNORMAL
SODIUM SERPL-SCNC: 139 MMOL/L (ref 136–145)
TOTAL CELLS COUNTED BLD: 100
TSH SERPL-ACNC: 1.87 MIU/L (ref 0.44–3.98)
WBC # BLD AUTO: 10.2 X10*3/UL (ref 4.4–11.3)

## 2025-03-31 PROCEDURE — 84484 ASSAY OF TROPONIN QUANT: CPT | Performed by: PHYSICIAN ASSISTANT

## 2025-03-31 PROCEDURE — 36415 COLL VENOUS BLD VENIPUNCTURE: CPT | Performed by: PHYSICIAN ASSISTANT

## 2025-03-31 PROCEDURE — 71275 CT ANGIOGRAPHY CHEST: CPT

## 2025-03-31 PROCEDURE — 96361 HYDRATE IV INFUSION ADD-ON: CPT

## 2025-03-31 PROCEDURE — 93005 ELECTROCARDIOGRAM TRACING: CPT

## 2025-03-31 PROCEDURE — 85007 BL SMEAR W/DIFF WBC COUNT: CPT | Performed by: PHYSICIAN ASSISTANT

## 2025-03-31 PROCEDURE — 80053 COMPREHEN METABOLIC PANEL: CPT | Performed by: PHYSICIAN ASSISTANT

## 2025-03-31 PROCEDURE — 83735 ASSAY OF MAGNESIUM: CPT | Performed by: PHYSICIAN ASSISTANT

## 2025-03-31 PROCEDURE — 84443 ASSAY THYROID STIM HORMONE: CPT | Performed by: STUDENT IN AN ORGANIZED HEALTH CARE EDUCATION/TRAINING PROGRAM

## 2025-03-31 PROCEDURE — 71275 CT ANGIOGRAPHY CHEST: CPT | Mod: FOREIGN READ | Performed by: RADIOLOGY

## 2025-03-31 PROCEDURE — 3008F BODY MASS INDEX DOCD: CPT | Performed by: NURSE PRACTITIONER

## 2025-03-31 PROCEDURE — 2550000001 HC RX 255 CONTRASTS: Performed by: PHYSICIAN ASSISTANT

## 2025-03-31 PROCEDURE — 99215 OFFICE O/P EST HI 40 MIN: CPT | Performed by: NURSE PRACTITIONER

## 2025-03-31 PROCEDURE — 4004F PT TOBACCO SCREEN RCVD TLK: CPT | Performed by: NURSE PRACTITIONER

## 2025-03-31 PROCEDURE — 94640 AIRWAY INHALATION TREATMENT: CPT

## 2025-03-31 PROCEDURE — 2500000004 HC RX 250 GENERAL PHARMACY W/ HCPCS (ALT 636 FOR OP/ED): Performed by: PHYSICIAN ASSISTANT

## 2025-03-31 PROCEDURE — 85027 COMPLETE CBC AUTOMATED: CPT | Performed by: PHYSICIAN ASSISTANT

## 2025-03-31 PROCEDURE — 96374 THER/PROPH/DIAG INJ IV PUSH: CPT

## 2025-03-31 PROCEDURE — 99285 EMERGENCY DEPT VISIT HI MDM: CPT | Mod: 25 | Performed by: STUDENT IN AN ORGANIZED HEALTH CARE EDUCATION/TRAINING PROGRAM

## 2025-03-31 PROCEDURE — 2500000002 HC RX 250 W HCPCS SELF ADMINISTERED DRUGS (ALT 637 FOR MEDICARE OP, ALT 636 FOR OP/ED): Performed by: PHYSICIAN ASSISTANT

## 2025-03-31 PROCEDURE — 84702 CHORIONIC GONADOTROPIN TEST: CPT | Performed by: PHYSICIAN ASSISTANT

## 2025-03-31 RX ORDER — PREDNISONE 20 MG/1
40 TABLET ORAL DAILY
Qty: 10 TABLET | Refills: 0 | Status: SHIPPED | OUTPATIENT
Start: 2025-03-31 | End: 2025-04-05

## 2025-03-31 RX ORDER — IPRATROPIUM BROMIDE AND ALBUTEROL SULFATE 2.5; .5 MG/3ML; MG/3ML
3 SOLUTION RESPIRATORY (INHALATION) ONCE
Status: COMPLETED | OUTPATIENT
Start: 2025-03-31 | End: 2025-03-31

## 2025-03-31 RX ORDER — AZITHROMYCIN 250 MG/1
250 TABLET, FILM COATED ORAL DAILY
Qty: 6 TABLET | Refills: 0 | Status: SHIPPED | OUTPATIENT
Start: 2025-03-31 | End: 2025-04-05

## 2025-03-31 RX ORDER — ALBUTEROL SULFATE 90 UG/1
2 INHALANT RESPIRATORY (INHALATION) EVERY 4 HOURS PRN
Qty: 18 G | Refills: 0 | Status: SHIPPED | OUTPATIENT
Start: 2025-03-31 | End: 2025-04-30

## 2025-03-31 RX ADMIN — IPRATROPIUM BROMIDE AND ALBUTEROL SULFATE 3 ML: 2.5; .5 SOLUTION RESPIRATORY (INHALATION) at 20:00

## 2025-03-31 RX ADMIN — IOHEXOL 75 ML: 350 INJECTION, SOLUTION INTRAVENOUS at 20:00

## 2025-03-31 RX ADMIN — SODIUM CHLORIDE 1000 ML: 9 INJECTION, SOLUTION INTRAVENOUS at 22:27

## 2025-03-31 RX ADMIN — METHYLPREDNISOLONE SODIUM SUCCINATE 125 MG: 125 INJECTION, POWDER, FOR SOLUTION INTRAMUSCULAR; INTRAVENOUS at 20:00

## 2025-03-31 ASSESSMENT — ENCOUNTER SYMPTOMS
NAUSEA: 0
DIARRHEA: 0
SINUS PAIN: 0
WHEEZING: 0
VOMITING: 0
CHOKING: 0
CHEST TIGHTNESS: 0
HEADACHES: 0
FATIGUE: 0
SORE THROAT: 0
CHILLS: 0
ABDOMINAL PAIN: 0
APPETITE CHANGE: 0
DIFFICULTY URINATING: 0
SHORTNESS OF BREATH: 1
NECK PAIN: 0
PALPITATIONS: 0
FEVER: 0
RHINORRHEA: 0
SINUS PRESSURE: 0
MYALGIAS: 0
WOUND: 0
COUGH: 0
BACK PAIN: 0
EYE PAIN: 0
TROUBLE SWALLOWING: 0
STRIDOR: 0
DIZZINESS: 0
CONSTIPATION: 0
ARTHRALGIAS: 0

## 2025-03-31 ASSESSMENT — PAIN SCALES - GENERAL: PAINLEVEL_OUTOF10: 0 - NO PAIN

## 2025-03-31 ASSESSMENT — COLUMBIA-SUICIDE SEVERITY RATING SCALE - C-SSRS
2. HAVE YOU ACTUALLY HAD ANY THOUGHTS OF KILLING YOURSELF?: NO
1. IN THE PAST MONTH, HAVE YOU WISHED YOU WERE DEAD OR WISHED YOU COULD GO TO SLEEP AND NOT WAKE UP?: NO
6. HAVE YOU EVER DONE ANYTHING, STARTED TO DO ANYTHING, OR PREPARED TO DO ANYTHING TO END YOUR LIFE?: NO

## 2025-03-31 ASSESSMENT — VISUAL ACUITY: OU: 1

## 2025-03-31 ASSESSMENT — PAIN - FUNCTIONAL ASSESSMENT: PAIN_FUNCTIONAL_ASSESSMENT: 0-10

## 2025-03-31 NOTE — ED PROVIDER NOTES
HPI   Chief Complaint   Patient presents with    Shortness of Breath       HPI  Patient 39-year-old female coming in for shortness of breath.  Apparently has had symptoms for the better part of a year, has been seeing her family doctor and has a pulmonary function test ordered for 9 April.  The patient says that she just feels like she is getting worse and was having trouble sleeping because she fell like she was gasping for air.  She does continue to smoke daily      Patient History   History reviewed. No pertinent past medical history.  Past Surgical History:   Procedure Laterality Date    BACK SURGERY      scoliosis surgery     Family History   Problem Relation Name Age of Onset    Hypertension Mother      Hypertension Father      Scoliosis Father      No Known Problems Brother      No Known Problems Brother      No Known Problems Son          heart problems    No Known Problems Son      Hypertension Maternal Grandmother      Breast cancer Maternal Grandmother      Hypertension Maternal Grandfather      Diabetes Maternal Grandfather      Hypertension Paternal Grandmother      Diabetes Paternal Grandmother      Diabetes Paternal Grandfather      Hypertension Paternal Grandfather      Liver cancer Paternal Grandfather      Kidney cancer Paternal Grandfather       Social History     Tobacco Use    Smoking status: Every Day     Current packs/day: 0.50     Types: Cigarettes    Smokeless tobacco: Never   Substance Use Topics    Alcohol use: Not Currently     Comment: social    Drug use: Not on file       Physical Exam   ED Triage Vitals [03/31/25 1916]   Temperature Heart Rate Respirations BP   37.2 °C (99 °F) 84 18 (!) 155/103      Pulse Ox Temp src Heart Rate Source Patient Position   (!) 92 % -- Monitor Sitting      BP Location FiO2 (%)     Left arm --       Physical Exam  PHYSICAL EXAMINATION    GENERAL APPEARANCE: Awake and alert.     VITAL SIGNS: As per the nurses' triage record.     HEENT: Normocephalic,  atraumatic. Extraocular muscles are intact. Pupils equal round and reactive to light. Conjunctiva are pink. Negative scleral icterus. Mucous membranes are moist. Tongue in the midline. Pharynx was without erythema or exudates, uvula midline    NECK: Soft Nontender and supple, full gross ROM, no meningeal signs.    CHEST: Nontender to palpation. Diminished bilaterally coarse breath sounds    HEART: S1, S2. Regular rate and rhythm. No murmurs, gallops or rubs.  Strong and equal pulses in the extremities.     ABDOMEN: Soft, nontender, nondistended, positive bowel sounds, no palpable masses.    MUSCULOSKELETAL: The calves are nontender to palpation. Full gross active range of motion. Ambulating on own with no acute difficulties     NEUROLOGICAL: Awake, alert and oriented x 3. Power intact in the upper and lower extremities. Sensation is intact to light touch in the upper and lower extremities.     IMMUNOLOGICAL: No lymphatic streaking noted     DERM: No petechiae, rashes, or ecchymoses.    ED Course & MDM   ED Course as of 04/01/25 1107   Mon Mar 31, 2025   2140 EKG personally interpreted by me performed at 2008  Sinus tachycardia ventricular at 111 right axis deviation no acute ischemic changes [EF]   2208 Of all findings.  Patient feels much better would like to go home.  Will perform an ambulatory test prior to departure. [AP]   2306 Well, some IV fluids still running in. [AP]      ED Course User Index  [AP] Alverto Horta PA-C  [EF] Ani Mitchell DO         Diagnoses as of 04/01/25 1107   Atypical pneumonia   Dyspnea, unspecified type   COPD exacerbation (Multi)                 No data recorded                                 Medical Decision Making  Parts of this chart have been completed using voice recognition software. Please excuse any errors of transcription.  My thought process and reason for plan has been formulated from the time that I saw the patient until the time of disposition and is not  specific to one specific moment during their visit and furthermore my MDM encompasses this entire chart and not only this text box.      HPI: Detailed above.    Exam: A medically appropriate exam performed, outlined above, given the known history and presentation.    History Limited by: Nothing    History obtained from: The patient    External/internal records reviewed: No external record reviewed    Social Determinants of Health considered during this visit: Lives at home    Chronic conditions impacting care: Tobacco abuse    Medications given during visit:  Medications   ipratropium-albuteroL (Duo-Neb) 0.5-2.5 mg/3 mL nebulizer solution 3 mL (3 mL nebulization Given 3/31/25 2000)   methylPREDNISolone sod succinate (SOLU-Medrol) injection 125 mg (125 mg intravenous Given 3/31/25 2000)   ipratropium-albuteroL (Duo-Neb) 0.5-2.5 mg/3 mL nebulizer solution 3 mL (3 mL nebulization Given 3/31/25 2000)   iohexol (OMNIPaque) 350 mg iodine/mL solution 75 mL (75 mL intravenous Given 3/31/25 2000)   sodium chloride 0.9 % bolus 1,000 mL (0 mL intravenous Stopped 3/31/25 3649)        Diagnostic/tests  Labs Reviewed   CBC WITH AUTO DIFFERENTIAL - Abnormal       Result Value    WBC 10.2      nRBC 0.0      RBC 5.15      Hemoglobin 16.8 (*)     Hematocrit 51.1 (*)     MCV 99      MCH 32.6      MCHC 32.9      RDW 12.4      Platelets 207      Immature Granulocytes %, Automated 0.2      Immature Granulocytes Absolute, Automated 0.02      Narrative:     The previously reported component Neutrophils % is no longer being reported.  The previously reported component Lymphocytes % is no longer being reported.  The previously reported component Monocytes % is no longer being reported.  The previously   reported component Eosinophils % is no longer being reported.  The previously reported component Basophils % is no longer being reported.  The previously reported component Absolute Neutrophils is no longer being reported.  The previously  reported   component Absolute Lymphocytes is no longer being reported.  The previously reported component Absolute Monocytes is no longer being reported.  The previously reported component Absolute Eosinophils is no longer being reported.  The previously reported   component Absolute Basophils is no longer being reported.   MANUAL DIFFERENTIAL - Abnormal    Neutrophils %, Manual 57.0      Lymphocytes %, Manual 31.0      Monocytes %, Manual 10.0      Eosinophils %, Manual 1.0      Basophils %, Manual 1.0      Seg Neutrophils Absolute, Manual 5.81      Lymphocytes Absolute, Manual 3.16      Monocytes Absolute, Manual 1.02 (*)     Eosinophils Absolute, Manual 0.10      Basophils Absolute, Manual 0.10      Total Cells Counted 100      RBC Morphology No significant RBC morphology present     COMPREHENSIVE METABOLIC PANEL - Normal    Glucose 79      Sodium 139      Potassium 4.6      Chloride 103      Bicarbonate 31      Anion Gap 10      Urea Nitrogen 14      Creatinine 0.51      eGFR >90      Calcium 9.1      Albumin 4.2      Alkaline Phosphatase 54      Total Protein 6.7      AST 12      Bilirubin, Total 0.3      ALT 12     MAGNESIUM - Normal    Magnesium 2.01     HUMAN CHORIONIC GONADOTROPIN, SERUM QUANTITATIVE - Normal    HCG, Beta-Quantitative <2      Narrative:      Total HCG measurement is performed using the [employer] Access   Immunoassay which detects intact HCG and free beta HCG subunit.    This test is not indicated for use as a tumor marker.   HCG testing is performed using a different test methodology at [facility] than other [facility]. Direct result comparison   should only be made within the same method.       SERIAL TROPONIN-INITIAL - Normal    Troponin I, High Sensitivity <3      Narrative:     Less than 99th percentile of normal range cutoff-  Female and children under 18 years old <14 ng/L; Male <21 ng/L: Negative  Repeat testing should be performed if clinically indicated.      Female and children under 18 years old 14-50 ng/L; Male 21-50 ng/L:  Consistent with possible cardiac damage and possible increased clinical   risk. Serial measurements may help to assess extent of myocardial damage.     >50 ng/L: Consistent with cardiac damage, increased clinical risk and  myocardial infarction. Serial measurements may help assess extent of   myocardial damage.      NOTE: Children less than 1 year old may have higher baseline troponin   levels and results should be interpreted in conjunction with the overall   clinical context.     NOTE: Troponin I testing is performed using a different   testing methodology at [facility]3] than at other   [facility]. Direct result comparisons should only   be made within the same method.   SERIAL TROPONIN, 1 HOUR - Normal    Troponin I, High Sensitivity 3      Narrative:     Less than 99th percentile of normal range cutoff-  Female and children under 18 years old <14 ng/L; Male <21 ng/L: Negative  Repeat testing should be performed if clinically indicated.     Female and children under 18 years old 14-50 ng/L; Male 21-50 ng/L:  Consistent with possible cardiac damage and possible increased clinical   risk. Serial measurements may help to assess extent of myocardial damage.     >50 ng/L: Consistent with cardiac damage, increased clinical risk and  myocardial infarction. Serial measurements may help assess extent of   myocardial damage.      NOTE: Children less than 1 year old may have higher baseline troponin   levels and results should be interpreted in conjunction with the overall   clinical context.     NOTE: Troponin I testing is performed using a different   testing methodology at [facility]3] than at other   [facility]. Direct result comparisons should only   be made within the same method.   TSH WITH REFLEX TO FREE T4 IF ABNORMAL - Normal    Thyroid Stimulating Hormone 1.87      Narrative:     TSH testing is performed  using different testing methodology at [facility] than at other [facility]. Direct result comparisons should only be made within the same method.     TROPONIN SERIES- (INITIAL, 1 HR)    Narrative:     The following orders were created for panel order Troponin I Series, High Sensitivity (0, 1 HR).  Procedure                               Abnormality         Status                     ---------                               -----------         ------                     Troponin I, High Sensiti...[[id number]]  Normal              Final result               Troponin, High Sensitivi...[[id number]]  Normal              Final result                 Please view results for these tests on the individual orders.      CT angio chest for pulmonary embolism   Final Result   1.  No evidence for pulmonary embolus, thoracic aortic dissection or   acute lung parenchymal process.   2.  Mild centrilobular emphysematous changes.   3.  Status post surgical fusion of the thoracic and lumbar lumbar   spine.   Signed by Cristobal Reynoso MD           EKG: Obtained read by attending physician reviewed myself and per my interpretation no sign of STEMI criteria        Considerations/further MDM:  I I have considered:  EPIGLOTTITIS, PNEUMONIA, MENINGITIS, OR URINARY TRACT INFECTION, PULMONARY CONTUSION, RESPIRATORY DISTRESS, EPIGLOTITIS, SINUSITIS, PULMONARY EFFUSION, CAD, ACS, RIB FRACTURE, ESOPHOGEAL VARICIES, RETAINED FB,       The patient feels improved, would like to go home.  The patient did have a jump in her heart rate however I believe that it was secondary to the nebulizer treatments.  This showed an improvement during ER visit.  The patient feels comfortable going home would like to go home.  And already has pulmonary function test scheduled.  I believe that this is probably more of a COPD/emphysema finding.  Patient has been a longtime smoker.    On an ambulatory pulse ox she did have a transient drop to 87%.   But quickly recovered at rest.  Recommended hospitalization patient refused.  Final disposition has been performed by attending physician.    Patient will be treated for atypical pneumonia based on duration of symptoms.  Return precautions follow-up instructions discussed.    11:07 PM -time of my departure from shift.  Still pending the fluids to be completed and the ambulatory pulse ox.  With tentative plan for discharge if vitals maintain.  Patient is also requesting this.-My physician discharge patient has been discharged by attending physician.  Please refer to their note for details of discharge instructions, home going plan, home going medications, return precautions follow-up instructions and final disposition plan      Procedure  Procedures     Alverto Horta PA-C  04/01/25 1103

## 2025-03-31 NOTE — PROGRESS NOTES
Subjective   Patient ID: April Daisha is a 39 y.o. female. They present today with a chief complaint of Breathing Problem (Patient states that she's been having problems breathing since getting COVID over 1 year ago.).    History of Present Illness  Patient is a 38yo female who presents with breathing problems ever since she had covid a year ago.           Past Medical History  Allergies as of 03/31/2025    (No Known Allergies)       (Not in a hospital admission)       No past medical history on file.    Past Surgical History:   Procedure Laterality Date    BACK SURGERY      scoliosis surgery        reports that she has been smoking cigarettes. She has never used smokeless tobacco. She reports that she does not currently use alcohol.    Review of Systems  Review of Systems   Constitutional:  Negative for appetite change, chills, fatigue and fever.   HENT:  Negative for congestion, dental problem, ear pain, hearing loss, postnasal drip, rhinorrhea, sinus pressure, sinus pain, sneezing, sore throat and trouble swallowing.    Eyes:  Negative for pain.   Respiratory:  Positive for shortness of breath. Negative for cough, choking, chest tightness, wheezing and stridor.    Cardiovascular:  Negative for chest pain and palpitations.   Gastrointestinal:  Negative for abdominal pain, constipation, diarrhea, nausea and vomiting.   Genitourinary:  Negative for difficulty urinating.   Musculoskeletal:  Negative for arthralgias, back pain, gait problem, myalgias and neck pain.   Skin:  Negative for rash and wound.   Neurological:  Negative for dizziness and headaches.   Psychiatric/Behavioral:  Negative for self-injury and suicidal ideas.                                   Objective    Vitals:    03/31/25 1849   BP: 148/85   BP Location: Left arm   Patient Position: Sitting   BP Cuff Size: Small child   Pulse: (!) 122   Resp: 24   Temp: 36.7 °C (98.1 °F)   TempSrc: Oral   SpO2: (!) 89%   Weight: (!) 32.7 kg (72 lb)   Height: 1.549  "m (5' 1\")     No LMP recorded.    Physical Exam  Vitals reviewed.   Constitutional:       General: She is awake. She is not in acute distress.     Appearance: Normal appearance. She is well-developed and well-groomed. She is not ill-appearing.   HENT:      Head: Normocephalic and atraumatic.      Right Ear: Hearing, tympanic membrane, ear canal and external ear normal.      Left Ear: Hearing, tympanic membrane, ear canal and external ear normal.      Nose: No nasal deformity, signs of injury, congestion or rhinorrhea.      Mouth/Throat:      Lips: Pink.      Mouth: Mucous membranes are moist. No injury.      Dentition: Normal dentition.      Pharynx: Oropharynx is clear. Uvula midline. No pharyngeal swelling, oropharyngeal exudate, posterior oropharyngeal erythema, uvula swelling or postnasal drip.      Tonsils: No tonsillar exudate.   Eyes:      General: Lids are normal. Vision grossly intact. Gaze aligned appropriately.   Cardiovascular:      Rate and Rhythm: Regular rhythm. Tachycardia present.      Heart sounds: Normal heart sounds, S1 normal and S2 normal. Heart sounds not distant. No murmur heard.     No friction rub. No gallop.   Pulmonary:      Effort: Pulmonary effort is normal. Prolonged expiration present. No tachypnea, bradypnea, accessory muscle usage, respiratory distress or retractions.      Breath sounds: Normal breath sounds and air entry. No stridor, decreased air movement or transmitted upper airway sounds. No decreased breath sounds, wheezing, rhonchi or rales.   Lymphadenopathy:      Head:      Right side of head: No submental, submandibular, tonsillar, preauricular, posterior auricular or occipital adenopathy.      Left side of head: No submental, submandibular, tonsillar, preauricular, posterior auricular or occipital adenopathy.      Cervical:      Right cervical: No superficial cervical adenopathy.     Left cervical: No superficial cervical adenopathy.   Skin:     General: Skin is warm and " dry.      Capillary Refill: Capillary refill takes less than 2 seconds.   Neurological:      General: No focal deficit present.      Mental Status: She is alert.      Gait: Gait is intact.   Psychiatric:         Attention and Perception: Attention and perception normal.         Mood and Affect: Mood and affect normal.         Speech: Speech normal.         Behavior: Behavior normal. Behavior is cooperative.         Procedures    Point of Care Test & Imaging Results from this visit  No results found for this visit on 03/31/25.   Imaging  No results found.    Cardiology, Vascular, and Other Imaging  No other imaging results found for the past 2 days      Diagnostic study results (if any) were reviewed by MANJU Manuel.    Assessment/Plan   Allergies, medications, history, and pertinent labs/EKGs/Imaging reviewed by MANJU Manuel.     Medical Decision Making  Patient O2 saturation at 89% upon arrival and tachycardic. Patient placed on 2L oxygen and saturation went up to 96-97% and pulse went down to 55.  Prolonged expiration appreciated upon auscultation. No wheezing. Patient states that she has had xrays in the past and they do not show anything. Patient had an upcoming appointment with pulmonology that was canceled by the pulmonologist. When oxygen was removed from the patient her saturation dropped and bpm increased to 129.  For these reasons, the patient is being referred to the ED.  Report was called into [facility] ED to [place] at 7:00pm.  The patient was being transported by her partner. Patient was stable upon discharge.     Orders and Diagnoses  There are no diagnoses linked to this encounter.    Medical Admin Record      Patient disposition: ED    Electronically signed by MANJU Manuel  7:07 PM

## 2025-04-01 NOTE — ED PROVIDER NOTES
"The patient was seen in conjunction with the advanced practice provider, and I performed a substantive portion of the encounter. The following is my supervisory note.    History:  Patient is ED for acute onset of shortness of breath.  Notes worsening symptoms with exertion.  Notes minimal nonproductive cough.  Does not endorse any retrosternal pleuritic chest pain.  Notes no appreciable bilateral/neural leg swelling.  Denies any concerning history/Red flags for DVT/PE.  States symptoms have been progressive over about a year and planned PFT and pulmonary follow-up.  Notes tobacco smoker of 1 PPD.  Notes insidious unexpected weight loss.  Does note decrease in appetite without any specific food aversion.  Denies any infectious symptoms to include fever/chills or URI symptoms.  Notes no changes in bowel/bladder habits.  Denies any appreciable night sweats.    VS:  BP (!) 155/103 (BP Location: Left arm, Patient Position: Sitting)   Pulse 99   Temp 37.2 °C (99 °F)   Resp (!) 28   Ht 1.626 m (5' 4\")   Wt (!) 32.7 kg (72 lb)   SpO2 97% Comment: increased work of breathing, will go to room 12  BMI 12.36 kg/m²      Physical exam:  CONST: alert, normal appearance, no acute distress, appears chronically ill but does not appear toxic  NECK: no JVD  CV:  RRR, no murmurs, 2+ equal/symmetrical pulses x4, no BLE or unilateral leg swelling/edema  PULM: Mild diffuse expiratory wheezing bilaterally, no respiratory distress, not requiring supplemental O2  CHEST: non-tender, no crepitus/deformities, no paradoxical chest wall movement, no ecchymosis/erythema, no edema  ABD: soft, flat/non-tender/non-distended, no mass  SKIN: warm/dry, no pallor or jaundice, no rash  NEURO: A&Ox4, no focal neuro deficits      I personally reviewed and interpreted the following studies: EKG is sinus tachycardia 111, RAD, normal intervals, no appreciable ischemia with nonspecific TW findings, labs are significant for unremarkable/noncontributory, " images are notable for CT A/P no evidence of vaso-occlusive process consistent with PE, severe emphysematous changes appreciated .      MDM:  Patient presented to the ED for evaluation of worsening shortness of breath and dyspnea exertion with nonproductive cough and insidiously unexpected weight loss.  Concerning PMHx of tobacco use/dependency.    Per Chart Review: PCP office visit in 10/8/2024 for annual wellness exam, visit summary significant for recommendation for immunizations, reports intentional weight loss over the past year despite appropriate nutritional intake, does endorse decreased appetite, VSS, exam notable for extremely underweight appearance otherwise unremarkable/noncontributory, plan for obtaining updated updated labs and vaccinations, appropriate screenings, smoking cessation.    Assessment/evaluation consistent with likely undiagnosed mild to moderate COPD exacerbation. No concerning history, clinical evidence/work-up, or exam findings for the concerning differentials of ACS/MI, PTX, focal/multifocal lobar PNA anemia, significant electrolyte/metabolic derangement, PE, endocrine derangement, severe dehydration,. These conditions have been thoroughly evaluated and determined to be sufficiently unlikely to be the etiology of patient's presenting symptoms.     Scores: Heart 1 (1-2% MACE)    ED Course/Diagnosis:  ED Course as of 04/03/25 2217   Mon Mar 31, 2025   2140 EKG personally interpreted by me performed at 2008  Sinus tachycardia ventricular at 111 right axis deviation no acute ischemic changes [EF]   2208 Of all findings.  Patient feels much better would like to go home.  Will perform an ambulatory test prior to departure. [AP]   2306 Well, some IV fluids still running in. [AP]      ED Course User Index  [AP] Alverto Horta PA-C  [EF] Ani Mitchell DO         Diagnoses as of 04/03/25 2217   Atypical pneumonia   Dyspnea, unspecified type   COPD exacerbation (Multi)       1. Atypical  pneumonia  albuterol 90 mcg/actuation inhaler    predniSONE (Deltasone) 20 mg tablet    azithromycin (Zithromax Z-Red) 250 mg tablet      2. Dyspnea, unspecified type  albuterol 90 mcg/actuation inhaler    predniSONE (Deltasone) 20 mg tablet    azithromycin (Zithromax Z-Red) 250 mg tablet      3. COPD exacerbation (Multi)                 Kiko James MD  04/03/25 6133

## 2025-04-01 NOTE — DISCHARGE INSTRUCTIONS
Thank you for allowing myself and the team to take care of you during your emergency situation and addressing your health concerns.    You were evaluated for shortness of breathy/dyspnea on exertion.     Your likely condition/diagnosis: Likely undiagnosed COPD exacerbation given smoking history    During your visit in the emergency department you were evaluated for your presenting symptoms.  Based on the extensive workup you received,  all efforts were made to identify the source of your symptoms and identify any life-threatening conditions.  These life-threatening conditions that were attempted to be identified and medically managed/treated include but not limited to asthma/chronic obstructive pulmonary disease exacerbation, a blood clot in the lungs, inflammation/fluid around your heart, air/fluid between your lungs and chest wall, lung infection/pneumonia, and air/fluid (infection) within the middle part of your chest, heart failure (weakening of your hear) or fluid overload, kidney dysfunction/failure.  Additionally, you may be experiencing symptoms that are non-life-threatening but are uncomfortable and disruptive to your everyday life.  All efforts were made to manage your symptoms while in the emergency department along with appropriate at home therapy for symptomatic management during your recovery. Please be aware that not all of the conditions explained/discussed in these discharge instructions are applicable to your condition but encompass many of the conditions that were evaluated for during your ED encounter.      During your evaluation and assessment, all measures were taken to evaluate you and address your health concerns to identify dangerous and life threatening health conditions. It is not possible to identify all health conditions or pathologies and eliminate any chance of unfavorable outcomes while in the Emergency Department. My team encourages you to be vigilant with your health and follow-up  with the appropriate providers outlined in your discharge paperwork. Please return to the Emergency Department if you feel that your health has not improved or experiencing worsening symptoms.    Special instructions please take the medication as prescribed.  Please make a follow-up with your primary care physician to further manage symptoms and address your health concerns.    Incidental findings:  None

## 2025-04-02 LAB
ATRIAL RATE: 111 BPM
P AXIS: 85 DEGREES
P OFFSET: 201 MS
P ONSET: 163 MS
PR INTERVAL: 124 MS
Q ONSET: 225 MS
QRS COUNT: 19 BEATS
QRS DURATION: 72 MS
QT INTERVAL: 334 MS
QTC CALCULATION(BAZETT): 454 MS
QTC FREDERICIA: 410 MS
R AXIS: 119 DEGREES
T AXIS: 70 DEGREES
T OFFSET: 392 MS
VENTRICULAR RATE: 111 BPM

## 2025-04-09 ENCOUNTER — HOSPITAL ENCOUNTER (OUTPATIENT)
Dept: RESPIRATORY THERAPY | Facility: HOSPITAL | Age: 40
Setting detail: THERAPIES SERIES
Discharge: HOME | End: 2025-04-09
Payer: COMMERCIAL

## 2025-04-09 DIAGNOSIS — R06.02 SOB (SHORTNESS OF BREATH): ICD-10-CM

## 2025-04-09 DIAGNOSIS — Z72.0 TOBACCO USE: ICD-10-CM

## 2025-04-09 PROCEDURE — 2500000002 HC RX 250 W HCPCS SELF ADMINISTERED DRUGS (ALT 637 FOR MEDICARE OP, ALT 636 FOR OP/ED)

## 2025-04-09 PROCEDURE — 94726 PLETHYSMOGRAPHY LUNG VOLUMES: CPT

## 2025-04-09 RX ORDER — ALBUTEROL SULFATE 90 UG/1
1 INHALANT RESPIRATORY (INHALATION) ONCE
Status: COMPLETED | OUTPATIENT
Start: 2025-04-09 | End: 2025-04-09

## 2025-04-09 RX ORDER — ALBUTEROL SULFATE 0.83 MG/ML
SOLUTION RESPIRATORY (INHALATION)
Status: COMPLETED
Start: 2025-04-09 | End: 2025-04-09

## 2025-04-09 RX ORDER — ALBUTEROL SULFATE 0.83 MG/ML
3 SOLUTION RESPIRATORY (INHALATION) ONCE
Status: COMPLETED | OUTPATIENT
Start: 2025-04-09 | End: 2025-04-09

## 2025-04-09 RX ADMIN — ALBUTEROL SULFATE 3 ML: 0.83 SOLUTION RESPIRATORY (INHALATION) at 15:13

## 2025-04-09 RX ADMIN — ALBUTEROL SULFATE 3 ML: 2.5 SOLUTION RESPIRATORY (INHALATION) at 15:13

## 2025-04-17 DIAGNOSIS — R94.2 ABNORMAL PULMONARY FUNCTION TEST: Primary | ICD-10-CM

## 2025-04-17 DIAGNOSIS — R06.00 DYSPNEA, UNSPECIFIED TYPE: ICD-10-CM

## 2025-04-17 DIAGNOSIS — Z72.0 TOBACCO USE: ICD-10-CM

## 2025-05-05 ENCOUNTER — OFFICE VISIT (OUTPATIENT)
Dept: OBSTETRICS AND GYNECOLOGY | Facility: CLINIC | Age: 40
End: 2025-05-05
Payer: COMMERCIAL

## 2025-05-05 ENCOUNTER — TELEPHONE (OUTPATIENT)
Dept: OBSTETRICS AND GYNECOLOGY | Facility: CLINIC | Age: 40
End: 2025-05-05

## 2025-05-05 ENCOUNTER — LAB (OUTPATIENT)
Dept: LAB | Facility: HOSPITAL | Age: 40
End: 2025-05-05
Payer: COMMERCIAL

## 2025-05-05 VITALS — WEIGHT: 79.2 LBS | BODY MASS INDEX: 13.59 KG/M2 | DIASTOLIC BLOOD PRESSURE: 76 MMHG | SYSTOLIC BLOOD PRESSURE: 125 MMHG

## 2025-05-05 DIAGNOSIS — Z01.419 ENCOUNTER FOR ANNUAL ROUTINE GYNECOLOGICAL EXAMINATION: Primary | ICD-10-CM

## 2025-05-05 DIAGNOSIS — Z12.4 SCREENING FOR CERVICAL CANCER: ICD-10-CM

## 2025-05-05 DIAGNOSIS — Z30.431 INTRAUTERINE DEVICE SURVEILLANCE: ICD-10-CM

## 2025-05-05 DIAGNOSIS — Z12.31 SCREENING MAMMOGRAM FOR BREAST CANCER: ICD-10-CM

## 2025-05-05 DIAGNOSIS — Z80.9 FAMILY HISTORY OF CANCER: ICD-10-CM

## 2025-05-05 PROCEDURE — 99386 PREV VISIT NEW AGE 40-64: CPT | Performed by: STUDENT IN AN ORGANIZED HEALTH CARE EDUCATION/TRAINING PROGRAM

## 2025-05-05 PROCEDURE — 4004F PT TOBACCO SCREEN RCVD TLK: CPT | Performed by: STUDENT IN AN ORGANIZED HEALTH CARE EDUCATION/TRAINING PROGRAM

## 2025-05-05 ASSESSMENT — PAIN SCALES - GENERAL: PAINLEVEL_OUTOF10: 0-NO PAIN

## 2025-05-05 ASSESSMENT — PATIENT HEALTH QUESTIONNAIRE - PHQ9
2. FEELING DOWN, DEPRESSED OR HOPELESS: NOT AT ALL
1. LITTLE INTEREST OR PLEASURE IN DOING THINGS: NOT AT ALL
SUM OF ALL RESPONSES TO PHQ9 QUESTIONS 1 & 2: 0
1. LITTLE INTEREST OR PLEASURE IN DOING THINGS: NOT AT ALL
SUM OF ALL RESPONSES TO PHQ9 QUESTIONS 1 & 2: 0
2. FEELING DOWN, DEPRESSED OR HOPELESS: NOT AT ALL

## 2025-05-05 ASSESSMENT — ENCOUNTER SYMPTOMS
OCCASIONAL FEELINGS OF UNSTEADINESS: 0
DEPRESSION: 0
LOSS OF SENSATION IN FEET: 0

## 2025-05-05 NOTE — PROGRESS NOTES
ANNUAL GYNECOLOGIC VISIT     Subjective   HPI:  April Daisha is a 39 y.o. female  No LMP recorded. (Menstrual status: IUD). for annual exam.    Complaints:   None  Periods: Not getting with the Mirena in place  Dysmenorrhea:  None    GYN History:   Current contraceptive:   Mirena, placed 10/31/2016  History of abnormal Pap smear:   Denies  History of abnormal mammogram:    Denies  Family History: Denies family history of colon, vulvar, vaginal, cervical, uterine, or ovarian cancer.   +Breast cancer in maternal grandmother     Last Pap Smear:  Result Date Procedure Results Follow-ups   10/31/2016 CONVERTED GYN CYTOLOGY Pap Smear: NILM        Last Mammogram:    OB History          3    Para   2    Term   2       0    AB   1    Living   2         SAB   1    IAB        Ectopic        Multiple        Live Births   2                  Medical History[1]    Surgical History[2]    Current Medications[3]     Social History[4]     Objective   /76   Wt (!) 35.9 kg (79 lb 3.2 oz)   BMI 13.59 kg/m²     Physical Exam  General: Alert and oriented, in no acute distress.  Psych: Normal affect and mood. Able to answer questions appropriately.   Breast/Axilla: Nipple piercings present. Normal to palpation bilaterally without masses, skin changes, or nipple discharge. No palpable supraclavicular or axillary lymphadenopathy.   Heart: Regular rate.  Lungs: Non labored respirations.  Abdomen: Soft, non-tender, without masses or organomegaly.  GYN:  Speculum:  Vulva: Normal architecture without erythema, masses, or lesions.   Vagina: Normal moist mucosa without lesions, masses, or atrophy. No abnormal vaginal discharge.   Cervix: Normal without erythema, masses, lesions, or signs of cervicitis. IUD strings seen coming from the cervical os.  Bimanual:   Cervix: No cervical motion tenderness.  Uterus: Normal, mobile, non-enlarged, non tender uterus.  Adnexa: Normal without tenderness, nodularity, masses, or  lesions.    Assessment/Plan     39 y.o. , here for annual GYN examination  Assessment & Plan  Encounter for annual routine gynecological examination  - Routine annual  - Discussed OB/GYN Preventive Measures:     - Pap smear indicated every 3-5 years if normal and otherwise low risk   - Self breast exam monthly and clinical breast examination/mammogram yearly   - Screening colonoscopy recommended starting age 45, then Q3-10 years depending on testing and family history   - Genitourinary skin hygiene discussed.  - Diet/Weight management discussed.  - Exercise 30-60 minutes 3-5 times/week       Intrauterine device surveillance  -IUD placed 10/31/2016  -IUD strings seen during exam today  -Discussed that Mirena IUD good for 8 years for contraception  -Patient voiced understanding and would like to have another Mirena IUD, paperwork completed today  -Plan for her to return for removal and replacement       Screening for cervical cancer  -Due for pap smear, collected today  Orders:    THINPREP PAP TEST    Screening mammogram for breast cancer  -Has never had mammogram before, orders placed today  Orders:    BI mammo bilateral screening tomosynthesis; Future    Family history of cancer  -FH of breast cancer  -Desires hereditary genetic screening  -Orders placed  Orders:    [facility]; [facility] - Miscellaneous Genetics Test      Andrew Wright MD        [1]   Past Medical History:  Diagnosis Date    Acne     Asthma     Kidney stones     Ovarian cyst 10/27/2016    3cm    Scoliosis     Hardware in place   [2]   Past Surgical History:  Procedure Laterality Date    BACK SURGERY      scoliosis surgery    DILATION AND CURETTAGE OF UTERUS      ILIAC CREST BONE GRAFT      SPINAL FUSION      T3-L3, screws at L2/L3   [3]   Current Outpatient Medications:     albuterol 90 mcg/actuation inhaler, Inhale 2 puffs every 4 hours if needed for wheezing., Disp: 18 g, Rfl: 0    levonorgestrel (Mirena) 21 mcg/24 hours (8 yrs) 52 mg  IUD, by intrauterine route. INSERTED 10/31/2016, Disp: , Rfl:   [4]   Social History  Tobacco Use    Smoking status: Former     Current packs/day: 0.50     Types: Cigarettes    Smokeless tobacco: Never   Vaping Use    Vaping status: Every Day    Substances: Nicotine    Devices: Disposable   Substance Use Topics    Alcohol use: Not Currently     Comment: social    Drug use: Never

## 2025-05-21 LAB
CYTOLOGY CMNT CVX/VAG CYTO-IMP: NORMAL
HPV HR 12 DNA GENITAL QL NAA+PROBE: NEGATIVE
HPV HR GENOTYPES PNL CVX NAA+PROBE: NEGATIVE
HPV16 DNA SPEC QL NAA+PROBE: NEGATIVE
HPV18 DNA SPEC QL NAA+PROBE: NEGATIVE
LAB AP HPV GENOTYPE QUESTION: YES
LAB AP HPV HR: NORMAL
LABORATORY COMMENT REPORT: NORMAL
PATH REPORT.TOTAL CANCER: NORMAL

## 2025-05-30 ENCOUNTER — TELEPHONE (OUTPATIENT)
Dept: OBSTETRICS AND GYNECOLOGY | Facility: CLINIC | Age: 40
End: 2025-05-30
Payer: COMMERCIAL

## 2025-05-30 NOTE — TELEPHONE ENCOUNTER
Received a message from [employer] that her [facility] test was cancelled.  Pt does not meet criteria for the test to be covered.  They attempted to contact the patient 3 times with no return phone call.  JANICEO